# Patient Record
Sex: MALE | Race: WHITE | NOT HISPANIC OR LATINO
[De-identification: names, ages, dates, MRNs, and addresses within clinical notes are randomized per-mention and may not be internally consistent; named-entity substitution may affect disease eponyms.]

---

## 2022-11-11 PROBLEM — Z00.00 ENCOUNTER FOR PREVENTIVE HEALTH EXAMINATION: Status: ACTIVE | Noted: 2022-11-11

## 2022-11-14 ENCOUNTER — APPOINTMENT (OUTPATIENT)
Dept: ORTHOPEDIC SURGERY | Facility: CLINIC | Age: 47
End: 2022-11-14

## 2022-11-14 VITALS — WEIGHT: 230 LBS | HEIGHT: 68 IN | BODY MASS INDEX: 34.86 KG/M2

## 2022-11-14 DIAGNOSIS — G89.29 LOW BACK PAIN, UNSPECIFIED: ICD-10-CM

## 2022-11-14 DIAGNOSIS — M54.50 LOW BACK PAIN, UNSPECIFIED: ICD-10-CM

## 2022-11-14 DIAGNOSIS — Z78.9 OTHER SPECIFIED HEALTH STATUS: ICD-10-CM

## 2022-11-14 PROCEDURE — 72110 X-RAY EXAM L-2 SPINE 4/>VWS: CPT

## 2022-11-14 PROCEDURE — 99203 OFFICE O/P NEW LOW 30 MIN: CPT

## 2022-11-14 NOTE — HISTORY OF PRESENT ILLNESS
[de-identified] : 11/14/2022 - 46 y/o male presenting for an initial evaluation of lumbar. Complains of low back/SI joint pain. Denies b/l lower extremity pain or paresthesias. Flare up starting approximately 1 week ago, but improving. symptoms usually responsive to home exercise rehab. Recent treatment with oral prednisone for skin lesions on the right leg. \par \par 11/14/2022 - The patient is a 47 year male who presents today complaining of Low back, SI Joint pain\par Date of Injury/Onset: chronic, flared up 1 week ago\par Pain:  At Rest: 0/10 \par With Activity:  2/10 \par Quality of symptoms: achy pain\par Improves with: sleeping in recliner\par Worse with: getting up from lying down\par Prior treatment: physical therapy in the past\par Prior Imaging: no\par Additional Information: None\par \par

## 2022-11-14 NOTE — ASSESSMENT
[FreeTextEntry1] : 47M with recent increase in intermittent LBP.  Already starting to subside.  \par Discussed and reviewed results of lumbar spine x-ray taken in office. Patient was provided with a referral for lumbar and SI joint physical therapy to work on stretching, strengthening and range of motion. Patient was provided with a lumbar home exercise program. Patient will f/u on prn basis. \par \par Prior to appointment and during encounter with patient extensive medical records were reviewed including but not limited to, hospital records, outpatient records, imaging results, and lab data.During this appointment the patient was examined, diagnoses were discussed and explained in a face to face manner. In addition extensive time was spent reviewing aforementioned diagnostic studies. Counseling including abnormal image results, differential diagnoses, treatment options, risk and benefits, lifestyle changes, current condition, and current medications was performed. Patient's comments, questions, and concerns were addressed and patient verbalized understanding. Based on this patient's presentation at our office, which is an orthopedic spine surgeon's office, this patient inherently / intrinsically has a risk, however minute, of developing issues such as Cauda equina syndrome, bowel and bladder changes, or progression of motor or neurological deficits such as paralysis which may be permanent.\par \par ROSA MARIA, Shabana Tomlinson, attest that this documentation has been prepared under the direction and in the presence of provider Bairon Fitzpatrick MD.

## 2022-11-14 NOTE — PHYSICAL EXAM
[NL (90)] : forward flexion 90 degrees [NL (30)] : right lateral rotation 30 degrees [NL (45)] : extension 45 degrees [NL (40)] : right lateral bending 40 degrees [5___] : right extensor hallicus longus 5[unfilled]/5 [de-identified] : Constitutional:\par - General Appearance:\par Unremarkable\par Body Habitus\par Well Developed\par Well Nourished\par Body Habitus\par No Deformities\par Well Groomed\par Ability To communicate:\par Normal\par Neurologic:\par Global sensation is intact to upper and lower extremities. See examination of Neck and/or Spine\par for exceptions.\par Orientation to Time, Place and Person is: Normal\par Mood And Affect is Normal\par Skin:\par - Head/Face, Right Upper/Lower Extremity, Left Upper/Lower Extremity: Normal\par See Examination of Neck and/or Spine for exceptions\par Cardiovascular:\par Peripheral Cardiovascular System is Normal\par Palpation of Lymph Nodes:\par Normal Palpation of lymph nodes in: Axilla, Cervical, Inguinal\par Abnormal Palpation of lymph nodes in: None  [] : non-antalgic [FreeTextEntry8] : Indicates pain over the b/l SI joint.  [FreeTextEntry1] : 11/14/2022 - mild L4-S1 DDD. No instability on flex x